# Patient Record
Sex: FEMALE | Race: OTHER
[De-identification: names, ages, dates, MRNs, and addresses within clinical notes are randomized per-mention and may not be internally consistent; named-entity substitution may affect disease eponyms.]

---

## 2017-06-12 ENCOUNTER — HOSPITAL ENCOUNTER (OUTPATIENT)
Dept: HOSPITAL 8 - CFH | Age: 47
Discharge: HOME | End: 2017-06-12
Attending: PSYCHIATRY & NEUROLOGY
Payer: MEDICARE

## 2017-06-12 DIAGNOSIS — J32.0: ICD-10-CM

## 2017-06-12 DIAGNOSIS — R90.82: ICD-10-CM

## 2017-06-12 DIAGNOSIS — G93.89: Primary | ICD-10-CM

## 2017-06-12 DIAGNOSIS — G35: ICD-10-CM

## 2017-06-12 PROCEDURE — 70553 MRI BRAIN STEM W/O & W/DYE: CPT

## 2017-06-12 PROCEDURE — A9585 GADOBUTROL INJECTION: HCPCS

## 2019-08-05 ENCOUNTER — HOSPITAL ENCOUNTER (OUTPATIENT)
Dept: HOSPITAL 8 - INFUSION | Age: 49
Discharge: HOME | End: 2019-08-05
Attending: PSYCHIATRY & NEUROLOGY
Payer: MEDICARE

## 2019-08-05 VITALS — BODY MASS INDEX: 20.08 KG/M2 | HEIGHT: 60 IN | WEIGHT: 102.29 LBS

## 2019-08-05 DIAGNOSIS — G35: Primary | ICD-10-CM

## 2019-08-05 DIAGNOSIS — Z86.69: ICD-10-CM

## 2019-08-05 PROCEDURE — 96365 THER/PROPH/DIAG IV INF INIT: CPT

## 2019-08-05 PROCEDURE — 96375 TX/PRO/DX INJ NEW DRUG ADDON: CPT

## 2019-08-05 PROCEDURE — 96366 THER/PROPH/DIAG IV INF ADDON: CPT

## 2020-05-03 ENCOUNTER — HOSPITAL ENCOUNTER (EMERGENCY)
Dept: HOSPITAL 8 - ED | Age: 50
Discharge: HOME | End: 2020-05-03
Payer: MEDICARE

## 2020-05-03 VITALS — DIASTOLIC BLOOD PRESSURE: 50 MMHG | SYSTOLIC BLOOD PRESSURE: 97 MMHG

## 2020-05-03 VITALS — BODY MASS INDEX: 20.78 KG/M2 | WEIGHT: 105.82 LBS | HEIGHT: 60 IN

## 2020-05-03 DIAGNOSIS — B02.9: Primary | ICD-10-CM

## 2020-05-03 PROCEDURE — 99283 EMERGENCY DEPT VISIT LOW MDM: CPT

## 2020-05-03 NOTE — NUR
MORA RN: Patient/Caregiver given discharge instructions and they have confirmed 
that they understand the instructions.  Patient ambulatory with steady gait.